# Patient Record
(demographics unavailable — no encounter records)

---

## 2024-11-07 NOTE — ASSESSMENT
[FreeTextEntry1] : # CAD - c/o chest pain - non-obstructive on LHC 1/2024 - cont ASA - cont rosuva 10 mg qd - 7/2024 TTE c/w nl LV sys fxn, EF 73%, no WMAs - 7/2024 MPI c/w nl perfusion - results discussed in detail with pt - cont imdur 30 mg   # HTN  - BP today 128/78 - stable  - cont lasix 20 mg qd  - cont imdur 30 mg  - cont diltiazem 180 mg qd  # HLD - stable - cont rosuva 10 mg qd - 10/2024 lipid panel c/w LDL 69, HDL 48, TG 67  # palpitations - s/p syncopal event - states she was previously diagnosed with SVT - cont diltiazem 180 mg qd - 8/2024 Event Monitor c/w 2 short episodes of SVT, otherwise normal - pt. states her sx have significantly improved with the higher dose of diltiazem - will continue  RTC 4 months.

## 2024-11-07 NOTE — HISTORY OF PRESENT ILLNESS
[FreeTextEntry1] : 57F w/ HTN, HLD, asthma, non-obstructive CAD (s/p cardiac cath 1/2024), and GERD who presents for cardiac evaluation. The pt initially presented to [facility] ED on 7/21/24 after a syncopal event and chest pain. In ED pt reported she had been having intermittent episodes of chest pain and has been taking sublingual nitro daily over the past few months. Denies any prior syncopal events. EKG with no acute changes and nonspecific T wave inversions in anterolateral leads. 7/22/24 TTE c/w nl LV sys fxn, EF 73%, no WMAs. 7/22/24 MPI w/ nl perfusion. Pt now presents for follow-up. Still endorsing chest discomfort and palpitations.   11/1/24 OV: pt returns today for f/u. States her symptoms have improved with the Imdur. Overall feeling well, no new complaints. 8/23/24 OV: pt returns today for f/u and results of Event Monitor.   8/2/24 ECG: NSR at 95 bpm, NSTWC

## 2024-11-07 NOTE — HISTORY OF PRESENT ILLNESS
[FreeTextEntry1] : 57F w/ HTN, HLD, asthma, non-obstructive CAD (s/p cardiac cath 1/2024), and GERD who presents for cardiac evaluation. The pt initially presented to [facility] ED on 7/21/24 after a syncopal event and chest pain. In ED pt reported she had been having intermittent episodes of chest pain and has been taking sublingual nitro daily over the past few months. Denies any prior syncopal events. EKG with no acute changes and nonspecific T wave inversions in anterolateral leads. 7/22/24 TTE c/w nl LV sys fxn, EF 73%, no WMAs. 7/22/24 MPI w/ nl perfusion. Pt now presents for follow-up. Still endorsing chest discomfort and palpitations.   11/1/24 OV: pt returns today for f/u. States her symptoms have improved with the Imdur. Overall feeling well, no new complaints. 8/23/24 OV: pt returns today for f/u and results of Event Monitor.   8/2/24 ECG: NSR at 95 bpm, NSTWC

## 2024-11-07 NOTE — HISTORY OF PRESENT ILLNESS
[FreeTextEntry1] : 57F w/ HTN, HLD, asthma, non-obstructive CAD (s/p cardiac cath 1/2024), and GERD who presents for cardiac evaluation. The pt initially presented to [facility] ED on 7/21/24 after a syncopal event and chest pain. In ED pt reported she had been having intermittent episodes of chest pain and has been taking sublingual nitro daily over the past few months. Denies any prior syncopal events. EKG with no acute changes and nonspecific T wave inversions in anterolateral leads. 7/22/24 TTE c/w nl LV sys fxn, EF 73%, no WMAs. 7/22/24 MPI w/ nl perfusion. Pt now presents for follow-up. Still endorsing chest discomfort and palpitations.   11/1/24 OV: pt returns today for f/u. States her symptoms have improved with the Imdur. Overall feeling well, no new complaints. 8/23/24 OV: pt returns today for f/u and results of Event Monitor.   8/2/24 ECG: NSR at 95 bpm, NSTWC

## 2024-11-07 NOTE — HISTORY OF PRESENT ILLNESS
[FreeTextEntry1] : 57F w/ HTN, HLD, asthma, non-obstructive CAD (s/p cardiac cath 1/2024), and GERD who presents for cardiac evaluation. The pt initially presented to [facility] ED on 7/21/24 after a syncopal event and chest pain. In ED pt reported she had been having intermittent episodes of chest pain and has been taking sublingual nitro daily over the past few months. Denies any prior syncopal events. EKG with no acute changes and nonspecific T wave inversions in anterolateral leads. 7/22/24 TTE c/w nl LV sys fxn, EF 73%, no WMAs. 7/22/24 MPI w/ nl perfusion. Pt now presents for follow-up. Still endorsing chest discomfort and palpitations.   11/1/24 OV: pt returns today for f/u. States her symptoms have improved with the Imdur. Overall feeling well, no new complaints. 8/23/24 OV: pt returns today for f/u and results of Event Monitor.   8/2/24 ECG: NSR at 95 bpm, NSTWC

## 2025-03-07 NOTE — HISTORY OF PRESENT ILLNESS
[FreeTextEntry1] : 58F w/ HTN, HLD, asthma, non-obstructive CAD (s/p cardiac cath 1/2024), and GERD who presents for cardiac evaluation. The pt initially presented to [facility] ED on 7/21/24 after a syncopal event and chest pain. In ED pt reported she had been having intermittent episodes of chest pain and has been taking sublingual nitro daily over the past few months. Denies any prior syncopal events. EKG with no acute changes and nonspecific T wave inversions in anterolateral leads. 7/22/24 TTE c/w nl LV sys fxn, EF 73%, no WMAs. 7/22/24 MPI w/ nl perfusion. Pt now presents for follow-up. Still endorsing chest discomfort and palpitations.   3/7/25 OV: pt returns today for f/u. Still endorsing occasional chest pain that radiates down her arm, now increasing in frequency. Otherwise feeling well.  11/1/24 OV: pt returns today for f/u. States her symptoms have improved with the Imdur. Overall feeling well, no new complaints. 8/23/24 OV: pt returns today for f/u and results of Event Monitor.   3/7/25 ECG: NSR at 88 bpm, NSTWC 8/2/24 ECG: NSR at 95 bpm, NSTWC

## 2025-03-07 NOTE — ASSESSMENT
[FreeTextEntry1] : # CAD - non-obstructive on C 1/2024 - cont ASA - cont rosuva 10 mg qd - 7/2024 TTE c/w nl LV sys fxn, EF 73%, no WMAs - 7/2024 MPI c/w nl perfusion - results discussed in detail with pt - cont imdur 30 mg qd except Sunday - still endorsing chest pain, now increasing in frequency - start ranexa 500 mg bid  # HTN - BP today 110/70 - stable for now - cont lasix 20 mg qd - cont imdur 30 mg - cont diltiazem 180 mg qd  # HLD - stable - cont rosuva 10 mg qd - 10/2024 lipid panel c/w LDL 69, HDL 48, TG 67 - check labs  # palpitations - s/p syncopal event - states she was previously diagnosed with SVT - cont diltiazem 180 mg qd - 8/2024 Event Monitor c/w 2 short episodes of SVT, otherwise normal - pt. states her sx have significantly improved with the higher dose of diltiazem - will continue - check labs  RTC Monday via TH

## 2025-03-07 NOTE — HISTORY OF PRESENT ILLNESS
[FreeTextEntry1] : 58F w/ HTN, HLD, asthma, non-obstructive CAD (s/p cardiac cath 1/2024), and GERD who presents for cardiac evaluation. The pt initially presented to [facility] ED on 7/21/24 after a syncopal event and chest pain. In ED pt reported she had been having intermittent episodes of chest pain and has been taking sublingual nitro daily over the past few months. Denies any prior syncopal events. EKG with no acute changes and nonspecific T wave inversions in anterolateral leads. 7/22/24 TTE c/w nl LV sys fxn, EF 73%, no WMAs. 7/22/24 MPI w/ nl perfusion. Pt now presents for follow-up. Still endorsing chest discomfort and palpitations.   3/7/25 OV: pt returns today for f/u. Still endorsing occasional chest pain that radiates down her arm, now increasing in frequency. Otherwise feeling well.  11/1/24 OV: pt returns today for f/u. States her symptoms have improved with the Imdur. Overall feeling well, no new complaints. 8/23/24 OV: pt returns today for f/u and results of Event Monitor.   3/7/25 ECG: NSR at 88 bpm, NSTWC 8/2/24 ECG: NSR at 95 bpm, NSTWC

## 2025-03-20 NOTE — HISTORY OF PRESENT ILLNESS
[Home] : at home, [unfilled] , at the time of the visit. [Medical Office: ([facility])___] : at the medical office located in  [Telephone (audio)] : This telephonic visit was provided via audio only technology. [No access to tele-video equipment] : patient lacks access to tele-video equipment. [Verbal consent obtained from patient] : the patient, [unfilled] [FreeTextEntry1] : 58F w/ HTN, HLD, asthma, non-obstructive CAD (s/p cardiac cath 1/2024), and GERD who presents for cardiac evaluation. The pt initially presented to [facility] ED on 7/21/24 after a syncopal event and chest pain. In ED pt reported she had been having intermittent episodes of chest pain and has been taking sublingual nitro daily over the past few months. Denies any prior syncopal events. EKG with no acute changes and nonspecific T wave inversions in anterolateral leads. 7/22/24 TTE c/w nl LV sys fxn, EF 73%, no WMAs. 7/22/24 MPI w/ nl perfusion. Pt now presents for follow-up. Still endorsing chest discomfort and palpitations.   3/10/25 TH: pt returns today via TH for results of labs. Overall feeling well since last seen, no new complaints.  3/7/25 OV: pt returns today for f/u. Still endorsing occasional chest pain that radiates down her arm, now increasing in frequency. Otherwise feeling well.  11/1/24 OV: pt returns today for f/u. States her symptoms have improved with the Imdur. Overall feeling well, no new complaints. 8/23/24 OV: pt returns today for f/u and results of Event Monitor.   3/7/25 ECG: NSR at 88 bpm, NSTWC 8/2/24 ECG: NSR at 95 bpm, NSTWC

## 2025-03-20 NOTE — HISTORY OF PRESENT ILLNESS
2145 BG 70 Notified by CNA. Patient refusing repeat BG. Will give 1 cup of juice and repeat 15 mins. 705 [facility] BG 98 
 
2328 Patient refusing lab draws. Hospitalist aware. 0000 Patient refusing vital signs. 0030 Patient attempting to get out of bed. 
 
0410 Patient still attempting to get out of bed.  
 
0500. Safety sitter at bedside. [Home] : at home, [unfilled] , at the time of the visit. [Medical Office: ([facility])___] : at the medical office located in  [Telephone (audio)] : This telephonic visit was provided via audio only technology. [No access to tele-video equipment] : patient lacks access to tele-video equipment. [Verbal consent obtained from patient] : the patient, [unfilled] [FreeTextEntry1] : 58F w/ HTN, HLD, asthma, non-obstructive CAD (s/p cardiac cath 1/2024), and GERD who presents for cardiac evaluation. The pt initially presented to [facility] ED on 7/21/24 after a syncopal event and chest pain. In ED pt reported she had been having intermittent episodes of chest pain and has been taking sublingual nitro daily over the past few months. Denies any prior syncopal events. EKG with no acute changes and nonspecific T wave inversions in anterolateral leads. 7/22/24 TTE c/w nl LV sys fxn, EF 73%, no WMAs. 7/22/24 MPI w/ nl perfusion. Pt now presents for follow-up. Still endorsing chest discomfort and palpitations.   3/10/25 TH: pt returns today via TH for results of labs. Overall feeling well since last seen, no new complaints.  3/7/25 OV: pt returns today for f/u. Still endorsing occasional chest pain that radiates down her arm, now increasing in frequency. Otherwise feeling well.  11/1/24 OV: pt returns today for f/u. States her symptoms have improved with the Imdur. Overall feeling well, no new complaints. 8/23/24 OV: pt returns today for f/u and results of Event Monitor.   3/7/25 ECG: NSR at 88 bpm, NSTWC 8/2/24 ECG: NSR at 95 bpm, NSTWC

## 2025-03-20 NOTE — ASSESSMENT
[FreeTextEntry1] : # CAD - non-obstructive on LHC 1/2024 - cont ASA - cont rosuva 10 mg qd - 7/2024 TTE c/w nl LV sys fxn, EF 73%, no WMAs - 7/2024 MPI c/w nl perfusion - results discussed in detail with pt - cont imdur 30 mg qd except Sunday - still endorsing chest pain, now increasing in frequency - start ranexa 500 mg bid  # HTN - stable for now - cont lasix 20 mg qd - cont imdur 30 mg - cont diltiazem 180 mg qd  # HLD - stable - cont rosuva 10 mg qd - 10/2024 lipid panel c/w LDL 69, HDL 48, TG 67 - 3/2025 lipid panel c/w LDL 54, HDL 59, , TG 60 - results discussed in detail with pt   # palpitations - s/p syncopal event - states she was previously diagnosed with SVT - cont diltiazem 180 mg qd - 8/2024 Event Monitor c/w 2 short episodes of SVT, otherwise normal - pt states her sx have significantly improved with the higher dose of diltiazem - will continue - labs reviewed in detail   RTC April 2025  Spent 11 minutes on telehealth/phone visit, all questions answered in detail.

## 2025-05-21 NOTE — HISTORY OF PRESENT ILLNESS
[FreeTextEntry1] : 58F w/ HTN, HLD, asthma, non-obstructive CAD (s/p cardiac cath 1/2024), and GERD who presents for cardiac evaluation. The pt initially presented to [facility] ED on 7/21/24 after a syncopal event and chest pain. In ED pt reported she had been having intermittent episodes of chest pain and has been taking sublingual nitro daily over the past few months. Denies any prior syncopal events. EKG with no acute changes and nonspecific T wave inversions in anterolateral leads. 7/22/24 TTE c/w nl LV sys fxn, EF 73%, no WMAs. 7/22/24 MPI w/ nl perfusion. Pt now presents for follow-up. Still endorsing chest discomfort and palpitations.   5/16/25 OV: pt returns today for f/u after starting Ranexa. Still endorsing chest discomfort.  3/10/25 TH: pt returns today via TH for results of labs. Overall feeling well since last seen, no new complaints.  3/7/25 OV: pt returns today for f/u. Still endorsing occasional chest pain that radiates down her arm, now increasing in frequency. Otherwise feeling well.  11/1/24 OV: pt returns today for f/u. States her symptoms have improved with the Imdur. Overall feeling well, no new complaints. 8/23/24 OV: pt returns today for f/u and results of Event Monitor.   3/7/25 ECG: NSR at 88 bpm, NSTWC 8/2/24 ECG: NSR at 95 bpm, NSTWC

## 2025-05-21 NOTE — ASSESSMENT
[FreeTextEntry1] : # CAD - non-obstructive on LHC 1/2024 - cont ASA - cont rosuva 10 mg qd - 7/2024 TTE c/w nl LV sys fxn, EF 73%, no WMAs - 7/2024 MPI c/w nl perfusion - results discussed in detail with pt - cont imdur 30 mg qd except Sunday - still endorsing chest pain, now increasing in frequency - increase ranexa to 1000 mg bid  # HTN - BP today 100/76 - stable for now - cont lasix 20 mg qd - cont imdur 30 mg - cont diltiazem 180 mg qd  # HLD - stable - cont rosuva 10 mg qd - 10/2024 lipid panel c/w LDL 69, HDL 48, TG 67 - 3/2025 lipid panel c/w LDL 54, HDL 59, , TG 60 - results discussed in detail with pt  # palpitations - s/p syncopal event - states she was previously diagnosed with SVT - cont diltiazem 180 mg qd - 8/2024 Event Monitor c/w 2 short episodes of SVT, otherwise normal - pt states her sx have significantly improved with the higher dose of diltiazem - will continue - labs reviewed in detail  RTC 6/2025

## 2025-05-21 NOTE — HISTORY OF PRESENT ILLNESS
[FreeTextEntry1] : 58F w/ HTN, HLD, asthma, non-obstructive CAD (s/p cardiac cath 1/2024), and GERD who presents for cardiac evaluation. The pt initially presented to [facility] ED on 7/21/24 after a syncopal event and chest pain. In ED pt reported she had been having intermittent episodes of chest pain and has been taking sublingual nitro daily over the past few months. Denies any prior syncopal events. EKG with no acute changes and nonspecific T wave inversions in anterolateral leads. 7/22/24 TTE c/w nl LV sys fxn, EF 73%, no WMAs. 7/22/24 MPI w/ nl perfusion. Pt now presents for follow-up. Still endorsing chest discomfort and palpitations.   5/16/25 OV: pt returns today for f/u after starting Ranexa. Still endorsing chest discomfort.  3/10/25 TH: pt returns today via TH for results of labs. Overall feeling well since last seen, no new complaints.  3/7/25 OV: pt returns today for f/u. Still endorsing occasional chest pain that radiates down her arm, now increasing in frequency. Otherwise feeling well.  11/1/24 OV: pt returns today for f/u. States her symptoms have improved with the Imdur. Overall feeling well, no new complaints. 8/23/24 OV: pt returns today for f/u and results of Event Monitor.   3/7/25 ECG: NSR at 88 bpm, NSTWC 8/2/24 ECG: NSR at 95 bpm, NSTWC

## 2025-05-21 NOTE — HISTORY OF PRESENT ILLNESS
[FreeTextEntry1] : 58F w/ HTN, HLD, asthma, non-obstructive CAD (s/p cardiac cath 1/2024), and GERD who presents for cardiac evaluation. The pt initially presented to [facility] ED on 7/21/24 after a syncopal event and chest pain. In ED pt reported she had been having intermittent episodes of chest pain and has been taking sublingual nitro daily over the past few months. Denies any prior syncopal events. EKG with no acute changes and nonspecific T wave inversions in anterolateral leads. 7/22/24 TTE c/w nl LV sys fxn, EF 73%, no WMAs. 7/22/24 MPI w/ nl perfusion. Pt now presents for follow-up. Still endorsing chest discomfort and palpitations.   5/16/25 OV: pt returns today for f/u after starting Ranexa. Still endorsing chest discomfort.  3/10/25 TH: pt returns today via TH for results of labs. Overall feeling well since last seen, no new complaints.  3/7/25 OV: pt returns today for f/u. Still endorsing occasional chest pain that radiates down her arm, now increasing in frequency. Otherwise feeling well.  11/1/24 OV: pt returns today for f/u. States her symptoms have improved with the Imdur. Overall feeling well, no new complaints. 8/23/24 OV: pt returns today for f/u and results of Event Monitor.   3/7/25 ECG: NSR at 88 bpm, NSTWC 8/2/24 ECG: NSR at 95 bpm, NSTWC

## 2025-05-21 NOTE — HISTORY OF PRESENT ILLNESS
[FreeTextEntry1] : 58F w/ HTN, HLD, asthma, non-obstructive CAD (s/p cardiac cath 1/2024), and GERD who presents for cardiac evaluation. The pt initially presented to [facility] ED on 7/21/24 after a syncopal event and chest pain. In ED pt reported she had been having intermittent episodes of chest pain and has been taking sublingual nitro daily over the past few months. Denies any prior syncopal events. EKG with no acute changes and nonspecific T wave inversions in anterolateral leads. 7/22/24 TTE c/w nl LV sys fxn, EF 73%, no WMAs. 7/22/24 MPI w/ nl perfusion. Pt now presents for follow-up. Still endorsing chest discomfort and palpitations.   5/16/25 OV: pt returns today for f/u after starting Ranexa. Still endorsing chest discomfort.  3/10/25 TH: pt returns today via TH for results of labs. Overall feeling well since last seen, no new complaints.  3/7/25 OV: pt returns today for f/u. Still endorsing occasional chest pain that radiates down her arm, now increasing in frequency. Otherwise feeling well.  11/1/24 OV: pt returns today for f/u. States her symptoms have improved with the Imdur. Overall feeling well, no new complaints. 8/23/24 OV: pt returns today for f/u and results of Event Monitor.   3/7/25 ECG: NSR at 88 bpm, NSTWC 8/2/24 ECG: NSR at 95 bpm, NSTWC

## 2025-06-03 NOTE — PHYSICAL EXAM
[General Appearance - Well Developed] : well developed [Normal Appearance] : normal appearance [Well Groomed] : well groomed [General Appearance - Well Nourished] : well nourished [No Deformities] : no deformities [General Appearance - In No Acute Distress] : no acute distress [Normal Conjunctiva] : the conjunctiva exhibited no abnormalities [Eyelids - No Xanthelasma] : the eyelids demonstrated no xanthelasmas [Normal Oral Mucosa] : normal oral mucosa [No Oral Pallor] : no oral pallor [No Oral Cyanosis] : no oral cyanosis [Normal Jugular Venous A Waves Present] : normal jugular venous A waves present [Normal Jugular Venous V Waves Present] : normal jugular venous V waves present [No Jugular Venous Flower A Waves] : no jugular venous flower A waves [Heart Rate And Rhythm] : heart rate and rhythm were normal [Heart Sounds] : normal S1 and S2 [Murmurs] : no murmurs present [Respiration, Rhythm And Depth] : normal respiratory rhythm and effort [Exaggerated Use Of Accessory Muscles For Inspiration] : no accessory muscle use [Auscultation Breath Sounds / Voice Sounds] : lungs were clear to auscultation bilaterally [Abdomen Soft] : soft [Abdomen Tenderness] : non-tender [Abdomen Mass (___ Cm)] : no abdominal mass palpated [Abnormal Walk] : normal gait [Gait - Sufficient For Exercise Testing] : the gait was sufficient for exercise testing [Nail Clubbing] : no clubbing of the fingernails [Cyanosis, Localized] : no localized cyanosis [Petechial Hemorrhages (___cm)] : no petechial hemorrhages [Skin Color & Pigmentation] : normal skin color and pigmentation [] : no rash [No Venous Stasis] : no venous stasis [Skin Lesions] : no skin lesions [No Skin Ulcers] : no skin ulcer [No Xanthoma] : no  xanthoma was observed [Oriented To Time, Place, And Person] : oriented to person, place, and time [Affect] : the affect was normal [Mood] : the mood was normal [No Anxiety] : not feeling anxious [FreeTextEntry1] : LE edema

## 2025-06-03 NOTE — HISTORY OF PRESENT ILLNESS
[FreeTextEntry1] : 57 yo lady with PMHx CAD, HTN, HLD, SVT  06/03/25 LE edema bilaterally w/ pain sometimes. Weakness.

## 2025-06-03 NOTE — DISCUSSION/SUMMARY
[FreeTextEntry1] : 59 yo lady with PMHx CAD, HTN, HLD, SVT  Assessment: 1. LE edema w/ pain  Plan: 1. LE venous duplex US and DONNELL/PVR 2. RTC 1mo   During non face-to-face time, I reviewed relevant portions of the patients medical record. During face-to-face time, I took a relevant history and examined the patient. I also explained differential diagnoses, relevant cardiac diagnoses, workup, and management plan, which required a moderate level of medical decision making. I answered all questions related to the patient's medical conditions.   Cierra FAN (John)  of Cardiology [facility] [employer] at [facility]

## 2025-06-13 NOTE — ASSESSMENT
[FreeTextEntry1] : # CAD - non-obstructive on LHC 1/2024 - cont ASA - cont rosuva 10 mg qd - 7/2024 TTE c/w nl LV sys fxn, EF 73%, no WMAs - 7/2024 MPI c/w nl perfusion - results discussed in detail with pt - cont imdur 30 mg qd except Sunday - now s/p episode of chest pain, dizziness, weakness, and headache in which she went to [facility] - obtain reports from [facility] (TTE) - ranexa decreased to 500 mg bid, cont for now - check [facility], r/o microvascular disease  # HTN - BP today 118/60 - stable for now - cont imdur 30 mg - cont diltiazem 180 mg qd - lasix increased to 40 mg qd, f/w Dr. Montano  # HLD - stable - cont rosuva 10 mg qd - 10/2024 lipid panel c/w LDL 69, HDL 48, TG 67 - 3/2025 lipid panel c/w LDL 54, HDL 59, , TG 60 - results discussed in detail with pt  # palpitations - s/p syncopal event - states she was previously diagnosed with SVT - cont diltiazem 180 mg qd - 8/2024 Event Monitor c/w 2 short episodes of SVT, otherwise normal - pt states her sx have significantly improved with the higher dose of diltiazem - will continue - labs reviewed in detail - now s/p episode of chest pain, dizziness, weakness, and headache in which she went to [facility] - unable to tolerate Event Monitor d/t rash and itching - refer to EP (Zainab Cuevas) for ILR  #recent hospitalization - obtain reports from [facility] - further recommendations pending results   RTC after

## 2025-06-13 NOTE — ASSESSMENT
[FreeTextEntry1] : # CAD - non-obstructive on [facility] 1/2024 - cont ASA - cont rosuva 10 mg qd - 7/2024 TTE c/w nl LV sys fxn, EF 73%, no WMAs - 7/2024 MPI c/w nl perfusion - results discussed in detail with pt - cont imdur 30 mg qd except Sunday - now s/p episode of chest pain, dizziness, weakness, and headache in which she went to [facility] - obtain reports from [facility] (TTE) - ranexa decreased to 500 mg bid, cont for now - check [facility], r/o microvascular disease  # HTN - BP today 118/60 - stable for now - cont imdur 30 mg - cont diltiazem 180 mg qd - lasix increased to 40 mg qd, f/w Dr. Montano  # HLD - stable - cont rosuva 10 mg qd - 10/2024 lipid panel c/w LDL 69, HDL 48, TG 67 - 3/2025 lipid panel c/w LDL 54, HDL 59, , TG 60 - results discussed in detail with pt  # palpitations - s/p syncopal event - states she was previously diagnosed with SVT - cont diltiazem 180 mg qd - 8/2024 Event Monitor c/w 2 short episodes of SVT, otherwise normal - pt states her sx have significantly improved with the higher dose of diltiazem - will continue - labs reviewed in detail - now s/p episode of chest pain, dizziness, weakness, and headache in which she went to [facility] - unable to tolerate Event Monitor d/t rash and itching - refer to EP (Zainab Cuevas) for ILR  #recent hospitalization - obtain reports from [facility] - further recommendations pending results   RTC after

## 2025-06-13 NOTE — HISTORY OF PRESENT ILLNESS
[FreeTextEntry1] : 58F w/ HTN, HLD, asthma, non-obstructive CAD (s/p cardiac cath 1/2024), and GERD who presents for cardiac evaluation. The pt initially presented to [facility] ED on 7/21/24 after a syncopal event and chest pain. In ED pt reported she had been having intermittent episodes of chest pain and has been taking sublingual nitro daily over the past few months. Denies any prior syncopal events. EKG with no acute changes and nonspecific T wave inversions in anterolateral leads. 7/22/24 TTE c/w nl LV sys fxn, EF 73%, no WMAs. 7/22/24 MPI w/ nl perfusion. Pt now presents for follow-up. Still endorsing chest discomfort and palpitations.   6/13/25 OV: pt returns today for f/u. Pt states that she had an episode of chest pain, headache, weakness, and dizziness ~2 weeks ago, went to [facility] for further evaluation. States she had a TTE and Event Monitor placed which she took off prematurely d/t pruritis and rash. Ranexa decreased to 500 mg bid from 1000 mg bid. Saw Dr. Montano on 6/3 for evaluation of LE edema, ABIs and LE US ordered. Lasix increased to 40 mg qd. Pt reports that she is still feeling weak and tired.  5/16/25 OV: pt returns today for f/u after starting Ranexa. Still endorsing chest discomfort.  3/10/25 TH: pt returns today via TH for results of labs. Overall feeling well since last seen, no new complaints.  3/7/25 OV: pt returns today for f/u. Still endorsing occasional chest pain that radiates down her arm, now increasing in frequency. Otherwise feeling well.  11/1/24 OV: pt returns today for f/u. States her symptoms have improved with the Imdur. Overall feeling well, no new complaints. 8/23/24 OV: pt returns today for f/u and results of Event Monitor.   3/7/25 ECG: NSR at 88 bpm, NSTWC 8/2/24 ECG: NSR at 95 bpm, NSTWC

## 2025-06-13 NOTE — HISTORY OF PRESENT ILLNESS
[FreeTextEntry1] : 58F w/ HTN, HLD, asthma, non-obstructive CAD (s/p cardiac cath 1/2024), and GERD who presents for cardiac evaluation. The pt initially presented to [facility] ED on 7/21/24 after a syncopal event and chest pain. In ED pt reported she had been having intermittent episodes of chest pain and has been taking sublingual nitro daily over the past few months. Denies any prior syncopal events. EKG with no acute changes and nonspecific T wave inversions in anterolateral leads. 7/22/24 TTE c/w nl LV sys fxn, EF 73%, no WMAs. 7/22/24 MPI w/ nl perfusion. Pt now presents for follow-up. Still endorsing chest discomfort and palpitations.   6/13/25 OV: pt returns today for f/u. Pt states that she had an episode of chest pain, headache, weakness, and dizziness ~2 weeks ago, went to [facility] for further evaluation. States she had a TTE and Event Monitor placed which she took off prematurely d/t pruritis and rash. Ranexa decreased to 500 mg bid from 1000 mg bid. Saw Dr. Montano on 6/3 for evaluation of LE edema, ABIs and LE US ordered. Lasix increased to 40 mg qd. Pt reports that she is still feeling weak and tired.  5/16/25 OV: pt returns today for f/u after starting Ranexa. Still endorsing chest discomfort.  3/10/25 TH: pt returns today via TH for results of labs. Overall feeling well since last seen, no new complaints.  3/7/25 OV: pt returns today for f/u. Still endorsing occasional chest pain that radiates down her arm, now increasing in frequency. Otherwise feeling well.  11/1/24 OV: pt returns today for f/u. States her symptoms have improved with the Imdur. Overall feeling well, no new complaints. 8/23/24 OV: pt returns today for f/u and results of Event Monitor.   3/7/25 ECG: NSR at 88 bpm, NSTWC 8/2/24 ECG: NSR at 95 bpm, NSTWC

## 2025-07-09 NOTE — PHYSICAL EXAM
[General Appearance - Well Developed] : well developed [Normal Appearance] : normal appearance [Well Groomed] : well groomed [General Appearance - Well Nourished] : well nourished [No Deformities] : no deformities [General Appearance - In No Acute Distress] : no acute distress [Left Infraclavicular] : left infraclavicular area [Clean] : clean [Dry] : dry [Well-Healed] : well-healed [Erythema] : not erythematous [Warm] : not warm [Tender] : not tender [Indurated] : not indurated

## 2025-07-09 NOTE — HISTORY OF PRESENT ILLNESS
[de-identified] : 58F [place]i/English speaking w/ FHx of CHF (mother), PMHx of HTN, HLD, asthma, non-obstructive CAD (s/p cardiac cath 4/2024 @ [facility]), angina, chronic LE edema, pSVT, NIALL on CPAP, GERD and anxiety with recent admission to [facility] 6/3-6/10/25 for chest pain/syncope w/ negative workup, presented to [facility] ED 6/20/25 after a syncopal episode on 6/19/25 at home while in bed. Stroke code called. CT brain negative. Pt syncopized while on the CT table 6/20/25 witnessed by neuro provider, found to have rapid pulse, not captured on tele. Pt was admitted to cardiology. S/p exercise stress EKG 6/23 with inconclusive findings. EP following, concern for prolonged QT and currently holding home Fluoxetine and Hydroxyzine. S/P ILR implanted with EP 6/24/25, QTc on discharge 452. Pt placed on nadolol 40mg daily.   Interval history significant for coronary catheterization 7/1/25 which was nonobstructive but positive for microvascular vasospasm of coronaries. She was placed on amlodipine 2.5mg daily, nitroglycerin s/l PRN and nadolol was cut to 20mg daily.   She notes recurrent c/p yesterday and this morning. Not associated with arrhythmia on ILR. She had one 3s pause on ILR on 7/1 which correlated with the time of her cath. No other episodes. She denies recurrent syncope.

## 2025-07-15 NOTE — PHYSICAL EXAM
[General Appearance - Well Developed] : well developed [Normal Appearance] : normal appearance [Well Groomed] : well groomed [General Appearance - Well Nourished] : well nourished [No Deformities] : no deformities [General Appearance - In No Acute Distress] : no acute distress [Normal Conjunctiva] : the conjunctiva exhibited no abnormalities [Eyelids - No Xanthelasma] : the eyelids demonstrated no xanthelasmas [Normal Oral Mucosa] : normal oral mucosa [No Oral Pallor] : no oral pallor [No Oral Cyanosis] : no oral cyanosis [Normal Jugular Venous A Waves Present] : normal jugular venous A waves present [Normal Jugular Venous V Waves Present] : normal jugular venous V waves present [No Jugular Venous Flower A Waves] : no jugular venous flower A waves [Respiration, Rhythm And Depth] : normal respiratory rhythm and effort [Exaggerated Use Of Accessory Muscles For Inspiration] : no accessory muscle use [Auscultation Breath Sounds / Voice Sounds] : lungs were clear to auscultation bilaterally [Heart Rate And Rhythm] : heart rate and rhythm were normal [Heart Sounds] : normal S1 and S2 [Murmurs] : no murmurs present [Abdomen Soft] : soft [Abdomen Tenderness] : non-tender [Abdomen Mass (___ Cm)] : no abdominal mass palpated [Abnormal Walk] : normal gait [Gait - Sufficient For Exercise Testing] : the gait was sufficient for exercise testing [Nail Clubbing] : no clubbing of the fingernails [Cyanosis, Localized] : no localized cyanosis [Petechial Hemorrhages (___cm)] : no petechial hemorrhages [Skin Color & Pigmentation] : normal skin color and pigmentation [] : no rash [No Venous Stasis] : no venous stasis [Skin Lesions] : no skin lesions [No Skin Ulcers] : no skin ulcer [No Xanthoma] : no  xanthoma was observed [Oriented To Time, Place, And Person] : oriented to person, place, and time [Affect] : the affect was normal [Mood] : the mood was normal [No Anxiety] : not feeling anxious [FreeTextEntry1] : LE edema

## 2025-07-15 NOTE — HISTORY OF PRESENT ILLNESS
[FreeTextEntry1] : 59 yo lady with PMHx CAD, HTN, HLD, SVT, DM2, LE venous disease  07/15/25 LE edema with minimal improvement with furosemide 06/03/25 LE edema bilaterally w/ pain sometimes. Weakness. 
[FreeTextEntry1] : 59 yo lady with PMHx CAD, HTN, HLD, SVT, DM2, LE venous disease  07/15/25 LE edema with minimal improvement with furosemide 06/03/25 LE edema bilaterally w/ pain sometimes. Weakness. 
No

## 2025-07-15 NOTE — PHYSICAL EXAM

## 2025-07-15 NOTE — DISCUSSION/SUMMARY
[FreeTextEntry1] : 57 yo lady with PMHx CAD, HTN, HLD, SVT, DM2, LE venous disease  Assessment: 1. LE edema 2/2 LE venous reflux 2. 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 LE venous duplex US and DONNELL/PVR -> DONNELL/PVR normal so no PAD but has significant LE venous reflux 2. LE compression w/ ACE wrap, elevation, and exercise. Switch to torsemide PO 3. Rest of cardiology care w/ Dr. Penn   During non face-to-face time, I reviewed relevant portions of the patients medical record. During face-to-face time, I took a relevant history and examined the patient. I also explained differential diagnoses, relevant cardiac diagnoses, workup, and management plan, which required a moderate level of medical decision making. I answered all questions related to the patient's medical conditions.   Cierra FAN (John)  of Cardiology [facility] [employer] [facility] at [facility]

## 2025-07-15 NOTE — DISCUSSION/SUMMARY
[FreeTextEntry1] : 57 yo lady with PMHx CAD, HTN, HLD, SVT, DM2, LE venous disease  Assessment: 1. LE edema 2/2 LE venous reflux 2. 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 LE venous duplex US and DONNELL/PVR -> DONNELL/PVR normal so no PAD but has significant LE venous reflux 2. LE compression w/ ACE wrap, elevation, and exercise. Switch to torsemide PO 3. Rest of cardiology care w/ Dr. Penn   During non face-to-face time, I reviewed relevant portions of the patients medical record. During face-to-face time, I took a relevant history and examined the patient. I also explained differential diagnoses, relevant cardiac diagnoses, workup, and management plan, which required a moderate level of medical decision making. I answered all questions related to the patient's medical conditions.   Cierra FAN (John)  of Cardiology [facility] [employer] at [facility]

## 2025-07-16 NOTE — ASSESSMENT
[FreeTextEntry1] : # CAD - non-obstructive on [facility] 1/2024 - cont ASA - cont rosuva 10 mg qd - 7/2024 TTE c/w nl LV sys fxn, EF 73%, no WMAs - 7/2024 MPI c/w nl perfusion - 6/2025 TTE c/w nl LV size and sys fxn, EF 59% - results discussed in detail with pt - 6/2025 [facility] c/w microvascular disease - imdur and ranexa d/c'd - cont nadolol 20 mg qd - cont amlodipine 2.5 mg qd  # HTN - BP today 100/60 - stable for now - d/c imdur 30 mg - d/c diltiazem 180 mg qd - cont nadolol 20 mg qd - cont amlodipine 2.5 mg qd - lasix increased to 40 mg qd, f/w Dr. Montano  # HLD - stable - cont rosuva 10 mg qd - 10/2024 lipid panel c/w LDL 69, HDL 48, TG 67 - 3/2025 lipid panel c/w LDL 54, HDL 59, , TG 60 - results discussed in detail with pt  # palpitations - s/p syncopal event - states she was previously diagnosed with SVT - diltiazem 180 mg qd d/c'd - 8/2024 Event Monitor c/w 2 short episodes of SVT, otherwise normal - pt states her sx have significantly improved with the higher dose of diltiazem - will continue - labs reviewed in detail - now s/p episode of chest pain, dizziness, weakness, and headache in which she went to [facility] - unable to tolerate Event Monitor d/t rash and itching - now s/p ILR 7/2025 - cont f/u w/ EP  RTC 10/2025

## 2025-07-16 NOTE — ASSESSMENT
[FreeTextEntry1] : # CAD - non-obstructive on [facility] 1/2024 - cont ASA - cont rosuva 10 mg qd - 7/2024 TTE c/w nl LV sys fxn, EF 73%, no WMAs - 7/2024 MPI c/w nl perfusion - 6/2025 TTE c/w nl LV size and sys fxn, EF 59% - results discussed in detail with pt - 6/2025 [facility] c/w microvascular disease - imdur and ranexa d/c'd - cont nadolol 20 mg qd - cont amlodipine 2.5 mg qd  # HTN - BP today 100/60 - stable for now - d/c imdur 30 mg - d/c diltiazem 180 mg qd - cont nadolol 20 mg qd - cont amlodipine 2.5 mg qd - lasix increased to 40 mg qd, f/w Dr. Montano  # HLD - stable - cont rosuva 10 mg qd - 10/2024 lipid panel c/w LDL 69, HDL 48, TG 67 - 3/2025 lipid panel c/w LDL 54, HDL 59, , TG 60 - results discussed in detail with pt  # palpitations - s/p syncopal event - states she was previously diagnosed with SVT - diltiazem 180 mg qd d/c'd - 8/2024 Event Monitor c/w 2 short episodes of SVT, otherwise normal - pt states her sx have significantly improved with the higher dose of diltiazem - will continue - labs reviewed in detail - now s/p episode of chest pain, dizziness, weakness, and headache in which she went to [facility] - unable to tolerate Event Monitor d/t rash and itching - now s/p ILR 7/2025 - cont f/u w/ EP  RTC 10/2025

## 2025-07-16 NOTE — HISTORY OF PRESENT ILLNESS
[FreeTextEntry1] : 58F w/ HTN, HLD, asthma, non-obstructive CAD (s/p cardiac cath 1/2024), and GERD who presents for cardiac evaluation. The pt initially presented to [facility] ED on 7/21/24 after a syncopal event and chest pain. In ED pt reported she had been having intermittent episodes of chest pain and has been taking sublingual nitro daily over the past few months. Denies any prior syncopal events. EKG with no acute changes and nonspecific T wave inversions in anterolateral leads. 7/22/24 TTE c/w nl LV sys fxn, EF 73%, no WMAs. 7/22/24 MPI w/ nl perfusion. Pt now presents for follow-up. Still endorsing chest discomfort and palpitations.   7/11/25 OV: pt returns today for post-discharge f/u s/p ILR placement and [facility] to r/o microvascular disease. Overall feeling well since d/c'd,  6/13/25 OV: pt returns today for f/u. Pt states that she had an episode of chest pain, headache, weakness, and dizziness ~2 weeks ago, went to [facility] for further evaluation. States she had a TTE and Event Monitor placed which she took off prematurely d/t pruritis and rash. Ranexa decreased to 500 mg bid from 1000 mg bid. Saw Dr. Montano on 6/3 for evaluation of LE edema, ABIs and LE US ordered. Lasix increased to 40 mg qd. Pt reports that she is still feeling weak and tired.  5/16/25 OV: pt returns today for f/u after starting Ranexa. Still endorsing chest discomfort.  3/10/25 TH: pt returns today via TH for results of labs. Overall feeling well since last seen, no new complaints.  3/7/25 OV: pt returns today for f/u. Still endorsing occasional chest pain that radiates down her arm, now increasing in frequency. Otherwise feeling well.  11/1/24 OV: pt returns today for f/u. States her symptoms have improved with the Imdur. Overall feeling well, no new complaints. 8/23/24 OV: pt returns today for f/u and results of Event Monitor.   3/7/25 ECG: NSR at 88 bpm, NSTWC 8/2/24 ECG: NSR at 95 bpm, NSTWC

## 2025-07-16 NOTE — HISTORY OF PRESENT ILLNESS
[FreeTextEntry1] : 58F w/ HTN, HLD, asthma, non-obstructive CAD (s/p cardiac cath 1/2024), and GERD who presents for cardiac evaluation. The pt initially presented to [facility] ED on 7/21/24 after a syncopal event and chest pain. In ED pt reported she had been having intermittent episodes of chest pain and has been taking sublingual nitro daily over the past few months. Denies any prior syncopal events. EKG with no acute changes and nonspecific T wave inversions in anterolateral leads. 7/22/24 TTE c/w nl LV sys fxn, EF 73%, no WMAs. 7/22/24 MPI w/ nl perfusion. Pt now presents for follow-up. Still endorsing chest discomfort and palpitations.   7/11/25 OV: pt returns today for post-discharge f/u s/p ILR placement and [facility] to r/o microvascular disease. Overall feeling well since d/c'd,  6/13/25 OV: pt returns today for f/u. Pt states that she had an episode of chest pain, headache, weakness, and dizziness ~2 weeks ago, went to [facility] for further evaluation. States she had a TTE and Event Monitor placed which she took off prematurely d/t pruritis and rash. Ranexa decreased to 500 mg bid from 1000 mg bid. Saw Dr. Montano on 6/3 for evaluation of LE edema, ABIs and LE US ordered. Lasix increased to 40 mg qd. Pt reports that she is still feeling weak and tired.  5/16/25 OV: pt returns today for f/u after starting Ranexa. Still endorsing chest discomfort.  3/10/25 TH: pt returns today via TH for results of labs. Overall feeling well since last seen, no new complaints.  3/7/25 OV: pt returns today for f/u. Still endorsing occasional chest pain that radiates down her arm, now increasing in frequency. Otherwise feeling well.  11/1/24 OV: pt returns today for f/u. States her symptoms have improved with the Imdur. Overall feeling well, no new complaints. 8/23/24 OV: pt returns today for f/u and results of Event Monitor.   3/7/25 ECG: NSR at 88 bpm, NSTWC 8/2/24 ECG: NSR at 95 bpm, NSTWC

## 2025-07-28 NOTE — HISTORY OF PRESENT ILLNESS
[de-identified] : 58F [place]orese/English speaking w/ FHx of CHF (mother), PMHx of HTN, HLD, asthma, non-obstructive CAD (s/p cardiac cath 4/2024 @ [facility]), angina, chronic LE edema, pSVT, NIALL on CPAP, GERD and anxiety with recent admission to [facility] 6/3-6/10/25 for chest pain/syncope w/ negative workup, presented to [facility] ED 6/20/25 after a syncopal episode on 6/19/25 at home while in bed. Stroke code called. CT brain negative. Pt syncopized while on the CT table 6/20/25 witnessed by neuro provider, found to have rapid pulse, not captured on tele. Pt was admitted to cardiology. S/p exercise stress EKG 6/23 with inconclusive findings. EP following, concern for prolonged QT and currently holding home Fluoxetine and Hydroxyzine. S/P ILR implanted with EP 6/24/25, QTc on discharge 452. Pt placed on nadolol 40mg daily.   Interval history significant for coronary catheterization 7/1/25 which was nonobstructive but positive for microvascular vasospasm of coronaries. She was placed on amlodipine 2.5mg daily, nitroglycerin s/l PRN and nadolol was cut to 20mg daily.   She notes recurrent c/p yesterday and this morning. Not associated with arrhythmia on ILR. She had one 3s pause on ILR on 7/1 which correlated with the time of her cath. No other episodes. She denies recurrent syncope.  recurrent syncope. Syncope often preceded by prodrome of chest pain and palpitations. EKG noted to have QTc 470-480 ms. Was on Fluoxetine at home (pt states that she started it 4 weeks ago) and hydroxyzine. We recommend stopping all QTc prolonging agents. No ectopies / arrhythmia on telemetry so far. Had Exercise Stress test to watch what happens to QT interval during exercise. However, it was inconclusive due to pt did not get HR up b/c she had chest pain. Subsequently underwent ILR on 6/24/25 for long term heart monitor for h/o syncope. QTc is improving slowly after stopping QTc prolongation meds. - She was started on Nadolol; gotten a dose of 20 mg x 1 yesterday. Tolerating it so far. Please proceed to Nadalol 40 mg daily today. - She was given f/u appt with Dr. Cuevas on 7/28 at 2 pm. She also can see our NP for a wound check on her ILR site on 7/9 at 4pm. - Recommend tylenol PRN and ice pack for pain at ILR site.

## 2025-07-28 NOTE — HISTORY OF PRESENT ILLNESS
[de-identified] : 58F [place]/English speaking w/ FHx of CHF (mother), PMHx of HTN, HLD, asthma, non-obstructive CAD (s/p cardiac cath 4/2024 @ [facility]), angina, chronic LE edema, pSVT, NIALL on CPAP, GERD and anxiety with recent admission to [facility] 6/3-6/10/25 for chest pain/syncope w/ negative workup, presented to [facility] ED 6/20/25 after a syncopal episode on 6/19/25 at home while in bed. Stroke code called. CT brain negative. Pt syncopized while on the CT table 6/20/25 witnessed by neuro provider, found to have rapid pulse, not captured on tele. Pt was admitted to cardiology. S/p exercise stress EKG 6/23 with inconclusive findings. EP following, concern for prolonged QT and currently holding home Fluoxetine and Hydroxyzine. S/P ILR implanted with EP 6/24/25, QTc on discharge 452. Pt placed on nadolol 40mg daily.   Interval history significant for coronary catheterization 7/1/25 which was nonobstructive but positive for microvascular vasospasm of coronaries. She was placed on amlodipine 2.5mg daily, nitroglycerin s/l PRN and nadolol was cut to 20mg daily.   She notes recurrent c/p yesterday and this morning. Not associated with arrhythmia on ILR. She had one 3s pause on ILR on 7/1 which correlated with the time of her cath. No other episodes. She denies recurrent syncope.  recurrent syncope. Syncope often preceded by prodrome of chest pain and palpitations. EKG noted to have QTc 470-480 ms. Was on Fluoxetine at home (pt states that she started it 4 weeks ago) and hydroxyzine. We recommend stopping all QTc prolonging agents. No ectopies / arrhythmia on telemetry so far. Had Exercise Stress test to watch what happens to QT interval during exercise. However, it was inconclusive due to pt did not get HR up b/c she had chest pain. Subsequently underwent ILR on 6/24/25 for long term heart monitor for h/o syncope. QTc is improving slowly after stopping QTc prolongation meds. - She was started on Nadolol; gotten a dose of 20 mg x 1 yesterday. Tolerating it so far. Please proceed to Nadalol 40 mg daily today. - She was given f/u appt with Dr. Cuevas on 7/28 at 2 pm. She also can see our NP for a wound check on her ILR site on 7/9 at 4pm. - Recommend tylenol PRN and ice pack for pain at ILR site.

## 2025-07-30 NOTE — ASSESSMENT
[FreeTextEntry1] : 59 yo F w/ h/o spinal fusion (lumbar with laminectomy and then spinal fusion t-spine - 2 surgeries), left knee replacements x 3, arthroscopic repair right shoulder, h/o DVT during pregnancy, 1 miscarraige after 4 weeks but has 3 healthy kids but no complications beside gestational diabetes in last kid, nonobstructive CAD status post cath 2024, angina, coronary vasospasm (sees cardiology at [facility]), hypertension, hyperlipidemia, chronic lower extremity edema, obstructive sleep apnea on CPAP, GERD, FHx RA in father. family history osteoporosis mother presenting here from cardiology for long QT syndrome but having multiple joint pain and family history of rheumatoid arthritis warrants evaluation for multiple diseases given fatigue as well.  Would check broad differential diagnosis for rheumatoid arthritis, ankylosing spondylitis given spinal stiffness, Sjogren's syndrome, antiphospholipid antibody syndrome, lupus, scleroderma, vitamin deficiencies, multiple myeloma, hypothyroid, hepatitis, vasculitis, lyme disease, osteoarthritis and gouty arthritis.  Would:  - Extensive laboratory workup for above with AVISE study  - xrays of hands and feet to evaluate for RA changes - X-ray of the pelvis and lumbar spine to differentiate if the patient has osteoarthritis versus ankylosing spondylitis  - Trial of prednisone 10 mg daily for 5 days to see if this helps with the joint pain.  Patient counseled to check blood sugars or to stop if she feels unwell with this medication  return in 3 weeks to review data and further plan.  Thank you for this referral

## 2025-07-30 NOTE — HISTORY OF PRESENT ILLNESS
[FreeTextEntry1] : initial visit July 30, 2025  57 yo F w/ h/o spinal fusion (lumbar with laminectomy and then spinal fusion t-spine - 2 surgeries), left knee replacements x 3, arthroscopic repair right shoulder, h/o DVT during pregnancy, 1 miscarraige after 4 weeks but has 3 healthy kids but no complications beside gestational diabetes in last kid, nonobstructive CAD status post cath 2024, angina, coronary vasospasm (sees cardiology at [facility]), hypertension, hyperlipidemia, chronic lower extremity edema, obstructive sleep apnea on CPAP, GERD, FHx RA in father.  family history osteoporosis mother presenting here from cardiology for long QT syndrome but complains of joint pain in multiple joints as well as fatigue as per below.  c/o pain in joints in hands, wrists, elbows, shoulders, neck, low back, hips, knees, ankles, toes.  daily pain and all day.  8/10 now constant but at night can be 10/10 and hard to sleep.  15 min morning stiffness.  notes swelling in finger joints and points to wrists and MCP joints.  motrin helps the pain.  tylenol helps too.  cannot take NSAIDs for last month due to heart stuff right now. 10 years of joint pain.  hairloss - diffuse. sun redness of skin + and also no oral ulcers.  + Raynauds.  chronic dry eyes and mouth.  fatigue x 6 years but more with worsening heart condition.  + SOB, + chest pain episodes, + myalgias, left sided vision issues.  reports + symptoms.

## 2025-07-30 NOTE — REVIEW OF SYSTEMS
[Dry Eyes] : dryness of the eyes [Chest Pain] : chest pain [Shortness Of Breath] : shortness of breath [Eye Pain] : eye pain [Itching] : itching [Feelings Of Weakness] : feelings of weakness [Fever] : no fever [Chills] : no chills

## 2025-07-30 NOTE — DATA REVIEWED
[FreeTextEntry1] : March 7, 2025 TSH is normal,  July 1, 2025 from [facility] with normal CMP and CBC albeit white count is on the low normal side at 3.8,  Per report, patient has MRI in 2014 of the lumbar spine from previous spinal surgery

## 2025-07-30 NOTE — PHYSICAL EXAM
[General Appearance - Alert] : alert [General Appearance - In No Acute Distress] : in no acute distress [General Appearance - Well Nourished] : well nourished [General Appearance - Well Developed] : well developed [Sclera] : the sclera and conjunctiva were normal [Outer Ear] : the ears and nose were normal in appearance [Hearing Threshold Finger Rub Not Toombs] : hearing was normal [Examination Of The Oral Cavity] : the lips and gums were normal [Neck Appearance] : the appearance of the neck was normal [Respiration, Rhythm And Depth] : normal respiratory rhythm and effort [Auscultation Breath Sounds / Voice Sounds] : lungs were clear to auscultation bilaterally [Heart Rate And Rhythm] : heart rate was normal and rhythm regular [Bowel Sounds] : normal bowel sounds [Abdomen Soft] : soft [Skin Color & Pigmentation] : normal skin color and pigmentation [] : no rash [FreeTextEntry1] : No patchy alopecia however very thinning of the hair mostly prominent on the hairline, no malar rash, no sclerodactyly, [No Focal Deficits] : no focal deficits [Affect] : the affect was normal